# Patient Record
Sex: FEMALE | Race: BLACK OR AFRICAN AMERICAN | Employment: UNEMPLOYED | URBAN - METROPOLITAN AREA
[De-identification: names, ages, dates, MRNs, and addresses within clinical notes are randomized per-mention and may not be internally consistent; named-entity substitution may affect disease eponyms.]

---

## 2020-03-23 ENCOUNTER — HOSPITAL ENCOUNTER (EMERGENCY)
Age: 25
Discharge: HOME | End: 2020-03-23
Attending: EMERGENCY MEDICINE
Payer: SELF-PAY

## 2020-03-23 VITALS
BODY MASS INDEX: 29.99 KG/M2 | DIASTOLIC BLOOD PRESSURE: 84 MMHG | HEART RATE: 81 BPM | HEIGHT: 65 IN | RESPIRATION RATE: 18 BRPM | OXYGEN SATURATION: 99 % | WEIGHT: 180 LBS | TEMPERATURE: 98.7 F | SYSTOLIC BLOOD PRESSURE: 134 MMHG

## 2020-03-23 DIAGNOSIS — J30.1 SEASONAL ALLERGIC RHINITIS DUE TO POLLEN: ICD-10-CM

## 2020-03-23 DIAGNOSIS — B34.9 VIRAL ILLNESS: Primary | ICD-10-CM

## 2020-03-23 PROCEDURE — 99282 EMERGENCY DEPT VISIT SF MDM: CPT

## 2020-03-23 RX ORDER — ALBUTEROL SULFATE 90 UG/1
2 AEROSOL, METERED RESPIRATORY (INHALATION)
Qty: 1 INHALER | Refills: 0 | Status: SHIPPED | OUTPATIENT
Start: 2020-03-23 | End: 2022-02-08

## 2020-03-23 RX ORDER — FEXOFENADINE HYDROCHLORIDE AND PSEUDOEPHEDRINE HYDROCHLORIDE 180; 240 MG/1; MG/1
1 TABLET, FILM COATED, EXTENDED RELEASE ORAL DAILY
Qty: 7 TAB | Refills: 0 | Status: SHIPPED | OUTPATIENT
Start: 2020-03-23 | End: 2020-03-30

## 2020-03-23 NOTE — ED NOTES
China Krishnamurthy is a 30 y.o. female that was discharged in good condition. The patients diagnosis, condition and treatment were explained to  patient and aftercare instructions were given. The patient verbalized understanding.

## 2020-03-23 NOTE — DISCHARGE INSTRUCTIONS
HOME ISOLATION PRECAUTIONS DURING COVID-19 OUTBREAK (per CDC guidelines)    1) Stay home except to get medical care:  People who are mildly ill with COVID-19 are able to isolate at home during their illness. You should restrict activities outside your home, except for getting medical care. Do not go to work, school, or public areas. Avoid using public transportation, ride-sharing, or taxis. 2) Separate yourself from other people and animals in your home  People: As much as possible, you should stay in a specific room and away from other people in your home. Also, you should use a separate bathroom, if available. 3) Animals: You should restrict contact with pets and other animals while you are sick with COVID-19, just like you would around other people. Although there have not been reports of pets or other animals becoming sick with COVID-19, it is still recommended that people sick with COVID-19 limit contact with animals until more information is known about the virus. When possible, have another member of your household care for your animals while you are sick. If you are sick with COVID-19, avoid contact with your pet, including petting, snuggling, being kissed or licked, and sharing food. If you must care for your pet or be around animals while you are sick, wash your hands before and after you interact with pets and wear a facemask. See COVID-19 and Animals for more information. 4) Call ahead before visiting your doctor  If you have a medical appointment, call the healthcare provider and tell them that you have or may have COVID-19. This will help the healthcare providers office take steps to keep other people from getting infected or exposed. 5) Wear a facemask  You should wear a facemask when you are around other people (e.g., sharing a room or vehicle) or pets and before you enter a healthcare providers office.  If you are not able to wear a facemask (for example, because it causes trouble breathing), then people who live with you should not stay in the same room with you, or they should wear a facemask if they enter your room. 6) Cover your coughs and sneezes  Cover your mouth and nose with a tissue when you cough or sneeze. Throw used tissues in a lined trash can. Immediately wash your hands with soap and water for at least 20 seconds or, if soap and water are not available, clean your hands with an alcohol-based hand  that contains at least 60% alcohol. 7) Clean your hands often  Wash your hands often with soap and water for at least 20 seconds, especially after blowing your nose, coughing, or sneezing; going to the bathroom; and before eating or preparing food. If soap and water are not readily available, use an alcohol-based hand  with at least 60% alcohol, covering all surfaces of your hands and rubbing them together until they feel dry. 8) Soap and water are the best option if hands are visibly dirty. Avoid touching your eyes, nose, and mouth with unwashed hands. 9) Avoid sharing personal household items  You should not share dishes, drinking glasses, cups, eating utensils, towels, or bedding with other people or pets in your home. After using these items, they should be washed thoroughly with soap and water. 10) Clean all high-touch surfaces everyday  High touch surfaces include counters, tabletops, doorknobs, bathroom fixtures, toilets, phones, keyboards, tablets, and bedside tables. Also, clean any surfaces that may have blood, stool, or body fluids on them. Use a household cleaning spray or wipe, according to the label instructions. Labels contain instructions for safe and effective use of the cleaning product including precautions you should take when applying the product, such as wearing gloves and making sure you have good ventilation during use of the product.     11) Monitor your symptoms  Seek prompt medical attention if your illness is worsening (e.g., difficulty breathing). Before seeking care, call your healthcare provider and tell them that you have, or are being evaluated for, COVID-19. Put on a facemask before you enter the facility. These steps will help the healthcare providers office to keep other people in the office or waiting room from getting infected or exposed. Ask your healthcare provider to call the local or state health department. Persons who are placed under active monitoring or facilitated self-monitoring should follow instructions provided by their local health department or occupational health professionals, as appropriate. When working with your local health department check their available hours. 12) If you have a medical emergency and need to call 911, notify the dispatch personnel that you have, or are being evaluated for COVID-19. If possible, put on a facemask before emergency medical services arrive. 13) Discontinuing home isolation  Patients with confirmed COVID-19 should remain under home isolation precautions until the risk of secondary transmission to others is thought to be low. The decision to discontinue home isolation precautions should be made on a case-by-case basis, in consultation with healthcare providers and [facility] and local health departments. Recommended precautions for household members, intimate partners, and caregivers in a nonhealthcare setting of  A patient with symptomatic laboratory-confirmed COVID-19   or   a patient under investigation  Household members, intimate partners, and caregivers in a nonhealthcare setting may have close contact2 with a person with symptomatic, laboratory-confirmed COVID-19 or a person under investigation.  Close contacts should monitor their health; they should call their healthcare provider right away if they develop symptoms suggestive of COVID-19 (e.g., fever, cough, shortness of breath)     Close contacts should also follow these recommendations:   Make sure that you understand and can help the patient follow their healthcare provider's instructions for medication(s) and care. You should help the patient with basic needs in the home and provide support for getting groceries, prescriptions, and other personal needs.  Monitor the patient's symptoms. If the patient is getting sicker, call his or her healthcare provider and tell them that the patient has laboratory-confirmed COVID-19. This will help the healthcare provider's office take steps to keep other people in the office or waiting room from getting infected. Ask the healthcare provider to call the local or [facility] health department for additional guidance. If the patient has a medical emergency and you need to call 911, notify the dispatch personnel that the patient has, or is being evaluated for COVID-19.   Household members should stay in another room or be  from the patient as much as possible. Household members should use a separate bedroom and bathroom, if available.  Prohibit visitors who do not have an essential need to be in the home.  Make sure that shared spaces in the home have good air flow, such as by an air conditioner or an opened window, weather permitting.  Perform hand hygiene frequently. Wash your hands often with soap and water for at least 20 seconds or use an alcohol-based hand  that contains 60 to 95% alcohol, covering all surfaces of your hands and rubbing them together until they feel dry. Soap and water should be used preferentially if hands are visibly dirty.  You and the patient should wear a facemask if you are in the same room.  Wear a disposable facemask and gloves when you touch or have contact with the patient's blood, stool, or body fluids, such as saliva, sputum, nasal mucus, vomit, urine. o Throw out disposable facemasks and gloves after using them. Do not reuse. o When removing personal protective equipment, first remove and dispose of gloves.  Then, immediately clean your hands with soap and water or alcohol-based hand . Next, remove and dispose of facemask, and immediately clean your hands again with soap and water or alcohol-based hand . [address] thoroughly.  o Immediately remove and wash clothes or bedding that have blood, stool, or body fluids on them.  o Wear disposable gloves while handling soiled items and keep soiled items away from your body. Clean your hands (with soap and water or an alcohol-based hand ) immediately after removing your gloves. o Read and follow directions on labels of laundry or clothing items and detergent. In general, using a normal laundry detergent according to washing machine instructions and dry thoroughly using the warmest temperatures recommended on the clothing label.  Discuss any additional questions with your state or local health department or healthcare provider. Footnotes  2Close contact is defined as--  a) being within approximately 6 feet (2 meters) of a COVID-19 case for a prolonged period of time; close contact can occur while caring for, living with, visiting, or sharing a health care waiting area or room with a COVID-19 case  - or -  b) having direct contact with infectious secretions of a COVID-19 case (e.g., being coughed on).

## 2020-03-23 NOTE — ED PROVIDER NOTES
EMERGENCY DEPARTMENT HISTORY AND PHYSICAL EXAM    Date: 3/23/2020  Patient Name: Marissa Bee    History of Presenting Illness     Chief Complaint   Patient presents with    Cough    Nasal Congestion         History Provided By: Patient        Additional History (Context): Marissa Bee is a 30 y.o. female with     Patient voices concern over possible COVID-19 infection. Patient denies close contact to confirmed patient with coronavirus. Also denies history of travel from any geographic areas of concern within the last 14 days prior to symptom onset. Grace was seen today for Cough and Nasal Congestion    She reports this started 1 days ago and is gradually worsening. She also reports: rhinorrhea, post nasal drip, productive cough, chest congestion and SOB. She denies a history of: sweats and chills, generalized sinus pain, swollen glands, wheezing and myalgias. Treatments have included: none. Relevant medical problems include include: No pertinent additional PMH and childhood asthma and seasonal allergies. PCP: Jericho Yoon MD    Current Outpatient Medications   Medication Sig Dispense Refill    albuterol (Ventolin HFA) 90 mcg/actuation inhaler Take 2 Puffs by inhalation every four (4) hours as needed for Wheezing. 1 Inhaler 0    fexofenadine-pseudoephedrine (Allegra-D 24 Hour) 180-240 mg per tablet Take 1 Tab by mouth daily for 7 days.  Indications: runny nose/congestion 7 Tab 0       Past History     Past Medical History:  Past Medical History:   Diagnosis Date    Gastrointestinal disorder     Scoliosis        Past Surgical History:  Past Surgical History:   Procedure Laterality Date    HX ENDOSCOPY         Family History:  Family History   Problem Relation Age of Onset    Diabetes Maternal Grandmother     Hypertension Maternal Grandmother     Stroke Maternal Grandmother     Stroke Maternal Grandfather     Hypertension Maternal Grandfather     Diabetes Maternal Grandfather     Cancer Neg Hx     Heart Disease Neg Hx        Social History:  Social History     Tobacco Use    Smoking status: Never Smoker   Substance Use Topics    Alcohol use: No    Drug use: No       Allergies: Allergies   Allergen Reactions    Motrin [Ibuprofen] Other (comments)     Burned GI track and needed endoscopy         Review of Systems     Review of Systems   Constitutional: Negative for chills and fever. HENT: positive for nasal congestion, sore throat, rhinorrhea  Eyes: Negative. Respiratory: Positive for productive cough and shortness of breath with coughing. Cardiovascular: Negative for chest pain and palpitations. Gastrointestinal: Negative for abdominal pain, constipation, diarrhea, nausea and vomiting. Genitourinary: Negative. Negative for difficulty urinating and flank pain. Musculoskeletal: Negative for back pain. Negative for gait problem and neck pain. Skin: Negative. Allergic/Immunologic: Negative. Neurological: Negative for dizziness, weakness, numbness and headaches. Psychiatric/Behavioral: Negative. All other systems reviewed and are negative. All Other Systems Negative  Physical Exam     Vitals:    03/23/20 1228   BP: 134/84   Pulse: 81   Resp: 18   Temp: 98.7 °F (37.1 °C)   SpO2: 99%   Weight: 81.6 kg (180 lb)   Height: 5' 5\" (1.651 m)     Physical Exam  Vitals signs and nursing note reviewed. Constitutional:       General: She is not in acute distress. Appearance: Normal appearance. She is not ill-appearing, toxic-appearing or diaphoretic. HENT:      Head: Normocephalic and atraumatic. Jaw: No trismus. Right Ear: Hearing, tympanic membrane, ear canal and external ear normal.      Left Ear: Tympanic membrane, ear canal and external ear normal.      Nose: Mucosal edema, congestion and rhinorrhea present. Right Turbinates: Enlarged and swollen. Left Turbinates: Enlarged and swollen.       Mouth/Throat:      Mouth: Mucous membranes are moist.      Pharynx: Oropharynx is clear. Uvula midline. No pharyngeal swelling, oropharyngeal exudate, posterior oropharyngeal erythema or uvula swelling. Tonsils: No tonsillar exudate or tonsillar abscesses. Eyes:      General: Lids are normal. Vision grossly intact. Conjunctiva/sclera: Conjunctivae normal.      Pupils: Pupils are equal, round, and reactive to light. Neck:      Musculoskeletal: Full passive range of motion without pain and normal range of motion. Cardiovascular:      Rate and Rhythm: Normal rate and regular rhythm. Pulses: Normal pulses. Heart sounds: Normal heart sounds. No murmur. No friction rub. No gallop. Pulmonary:      Effort: Pulmonary effort is normal. No respiratory distress. Breath sounds: Normal breath sounds. No stridor. No wheezing or rales. Abdominal:      Palpations: Abdomen is soft. Tenderness: There is no abdominal tenderness. Musculoskeletal: Normal range of motion. Skin:     General: Skin is warm and dry. Capillary Refill: Capillary refill takes less than 2 seconds. Neurological:      General: No focal deficit present. Mental Status: She is alert and oriented to person, place, and time. Psychiatric:         Mood and Affect: Mood normal.         Behavior: Behavior normal. Behavior is cooperative. Diagnostic Study Results     Labs -   No results found for this or any previous visit (from the past 12 hour(s)). Radiologic Studies -   No orders to display     CT Results  (Last 48 hours)    None        CXR Results  (Last 48 hours)    None            Medical Decision Making   I am the first provider for this patient. I reviewed the vital signs, available nursing notes, past medical history, past surgical history, family history and social history. Vital Signs-Reviewed the patient's vital signs.       Pulse Oximetry Analysis - 99% on room air    Records Reviewed: Nursing notes, old medical records and any previous labs, imaging, visits, consultations pertinent to patient care    Procedures:  Procedures    PPE worn during exam: Gloves, eye protection, gown, and surgical mask    ED Course: Progress Notes, Reevaluation, and Consults:    Provider Notes (Medical Decision Making):     Differential diagnosis: Seasonal allergies/allergic rhinitis, URI, strep/viral pharyngitis, pneumonia, reactive airway/asthma exacerbation 66-year-old female    66-year-old female patient here with coughing, nasal congestion, and rhinorrhea. Vital signs are stable and patient is afebrile. Exam unremarkable. No retraction,stridor, or wheezing, No indication for labs or imaging. Most consistent w/ viral infection, URI, COVID-19. Due to history of childhood asthma and allergic rhinitis will cover for symptomatic treatment for this. As well as, due to coronavirus outbreak will recommend a 14-day self quarantine. Patient is in agreement to the quarantine measures. Will be provided a work note as necessary. Reviewed with her that COVID-19 pandemic is an evolving situation with rapidly changing recommendations & guidelines. Medical decisions are made based on the the best information available at the time. Recommended she stay tuned for updates published by trusted sources and to advise your PCP of any unexpected changes in clinical condition     F/u w/ PCP and supportive treatment. Discussed the possibility of worsening despite outpatient treatment plan. Patient understands this possibility and will follow-up immediately with worsening symptoms. Follow-up w/ PCP and supportive treatment. Recommended taking Tylenol as needed for fever and body aches and to avoid NSAIDs if possible. MED RECONCILIATION:  No current facility-administered medications for this encounter.       Current Outpatient Medications   Medication Sig    albuterol (Ventolin HFA) 90 mcg/actuation inhaler Take 2 Puffs by inhalation every four (4) hours as needed for Wheezing.  fexofenadine-pseudoephedrine (Allegra-D 24 Hour) 180-240 mg per tablet Take 1 Tab by mouth daily for 7 days. Indications: runny nose/congestion       Disposition:  Discharge home in stable condition with strict 2-week self quarantine instructions    DISCHARGE NOTE:     Patient has been reexamined. Patient has no new complaints, changes, or physical findings. Vital signs are stable. Care plan outlined and precautions discussed. Results of work up, plan of care and treatment plan, expectations, etc were reviewed with the patient. All medications were reviewed with the patient; will d/c home with outpatient f/u. All of pt's questions and concerns were addressed. Patient was instructed and agrees to follow up with pcp/specialists if indicated, as well as to return to the ED upon further deterioration. Patient is ready to go home. Follow-up Information     Follow up With Specialties Details Why Contact Info    Jaydon Colby MD Pediatrics Schedule an appointment as soon as possible for a visit Follow-up from the Emergency Department [address] [facility] [address]  [address] [address]      [id number] [place] [address] EMERGENCY DEPT Emergency Medicine  As needed, If symptoms worsen [address] [facility]  [phone]          Current Discharge Medication List      START taking these medications    Details   albuterol (Ventolin HFA) 90 mcg/actuation inhaler Take 2 Puffs by inhalation every four (4) hours as needed for Wheezing. Qty: 1 Inhaler, Refills: 0      fexofenadine-pseudoephedrine (Allegra-D 24 Hour) 180-240 mg per tablet Take 1 Tab by mouth daily for 7 days. Indications: runny nose/congestion  Qty: 7 Tab, Refills: 0                 Diagnosis     Clinical Impression:   1. Viral illness    2. Seasonal allergic rhinitis due to pollen          Dictation disclaimer:  Please note that this dictation was completed with [employer], the [employer] voice recognition software.   Quite often unanticipated grammatical, syntax, homophones, and other interpretive errors are inadvertently transcribed by the computer software. Please disregard these errors. Please excuse any errors that have escaped final proofreading.

## 2020-03-24 ENCOUNTER — PATIENT OUTREACH (OUTPATIENT)
Dept: CASE MANAGEMENT | Age: 25
End: 2020-03-24

## 2020-03-24 NOTE — PROGRESS NOTES
COVID-19 Screening Initial Follow-up Note    Patient contacted regarding COVID-19 symptoms. Care Transition Nurse/ Ambulatory Care Manager contacted the patient by telephone to perform post discharge assessment. Verified name and  with patient as identifiers. Provided introduction to self, and explanation of the CTN/ACM role, and reason for call due to risk factors for infection and/or exposure to COVID-19. Patient states that she feels the same like having slight fever cough and some shortness of breath. CTN reminded Pt. To call PCP office for any questions, concerns, needs and/or assistance. Pt states okay. CTN advised Pt. To go back to ED for CP, increased or worsening SOB, difficulty breathing and/or having high fevers. Patient verbalized and repeated back understanding. Symptoms reviewed with patient who verbalized the following symptoms:   Fever yes slight fever per Pt. Fatigue no   Pain or aching joints no  Cough yes  Shortness of breath yes  Some per Pt. Hx of asthma per Pt. Confusion or unusual change in mental status no    Chills or shaking no    Sweating no    Fast heart rate no    Fast breathing no    Dizziness/lightheadedness no    Less urine output no    Cold, clammy, and pale skin no  Low body temperature no      Patient has following risk factors of: asthma CTN/ACM reviewed discharge instructions, medical action plan and red flags such as increased shortness of breath, increasing fever and signs of decompensation with patient who verbalized understanding. Discussed exposure protocols and quarantine with CDC Guidelines What to do if you are sick with coronavirus disease 2019 Patient who was given an opportunity for questions and concerns. The patient agrees to contact the [facility] exposure line, local health department and PCP office for questions related to their healthcare. CTN provided contact information for future reference.     Reviewed and educated patient on any new and changed medications related to discharge diagnosis     Plan for follow-up call in 5-7 days based on severity of symptoms and risk factors

## 2020-04-03 ENCOUNTER — PATIENT OUTREACH (OUTPATIENT)
Dept: CASE MANAGEMENT | Age: 25
End: 2020-04-03

## 2020-04-03 NOTE — PROGRESS NOTES
Care Transition Nurse/ Ambulatory Care Manager contacted the patient by telephone for follow up. Verified name and  with patient as identifiers. Provided introduction to self, and explanation of the CTN/ACM role, and reason for call due to risk factors for infection and/or exposure to COVID-19. Patient states that she is feeling better and all her S/S are all gone. Patient denied CP, SOB, fever, chills, headache, cough and congestion. Patient states that she is feeling better. Thanked us for follow up call and kept the conversation short.

## 2020-04-08 ENCOUNTER — PATIENT OUTREACH (OUTPATIENT)
Dept: CASE MANAGEMENT | Age: 25
End: 2020-04-08

## 2020-04-08 NOTE — PROGRESS NOTES
Someone answered the phone and states that Patient is not currently at home and wants CTN to call back at 6pm.

## 2020-04-28 ENCOUNTER — PATIENT OUTREACH (OUTPATIENT)
Dept: CASE MANAGEMENT | Age: 25
End: 2020-04-28

## 2020-04-28 NOTE — PROGRESS NOTES
Patient resolved from Transition of Care episode on 4/28/20    CTN reached Patient's family on phone who states that Pt. Will not be home till late tonight. CTN gave office contact information requesting for call back. Pt. Family states that she will give it to Pt. Patient family reported that Pt. Is doing well. No questions or concerns verbalized by Pt. Family at this time. No Pt. Medical information given to the family member who answered the Phone. No PHI or Permission to release info noted at this time. No further outreach scheduled with this CTN. Episode of Care resolved.

## 2022-02-08 ENCOUNTER — HOSPITAL ENCOUNTER (EMERGENCY)
Age: 27
Discharge: HOME | End: 2022-02-08
Attending: EMERGENCY MEDICINE

## 2022-02-08 VITALS
HEART RATE: 88 BPM | BODY MASS INDEX: 29.99 KG/M2 | OXYGEN SATURATION: 100 % | DIASTOLIC BLOOD PRESSURE: 104 MMHG | TEMPERATURE: 99.3 F | RESPIRATION RATE: 20 BRPM | SYSTOLIC BLOOD PRESSURE: 145 MMHG | WEIGHT: 180 LBS | HEIGHT: 65 IN

## 2022-02-08 DIAGNOSIS — K52.9 GASTROENTERITIS, ACUTE: Primary | ICD-10-CM

## 2022-02-08 DIAGNOSIS — Z20.822 SUSPECTED COVID-19 VIRUS INFECTION: ICD-10-CM

## 2022-02-08 LAB — SARS-COV-2, COV2: NORMAL

## 2022-02-08 PROCEDURE — 99282 EMERGENCY DEPT VISIT SF MDM: CPT

## 2022-02-08 PROCEDURE — U0003 INFECTIOUS AGENT DETECTION BY NUCLEIC ACID (DNA OR RNA); SEVERE ACUTE RESPIRATORY SYNDROME CORONAVIRUS 2 (SARS-COV-2) (CORONAVIRUS DISEASE [COVID-19]), AMPLIFIED PROBE TECHNIQUE, MAKING USE OF HIGH THROUGHPUT TECHNOLOGIES AS DESCRIBED BY CMS-2020-01-R: HCPCS

## 2022-02-08 PROCEDURE — 74011250637 HC RX REV CODE- 250/637: Performed by: PHYSICIAN ASSISTANT

## 2022-02-08 RX ORDER — DICYCLOMINE HYDROCHLORIDE 10 MG/1
10 CAPSULE ORAL 3 TIMES DAILY
Qty: 30 CAPSULE | Refills: 0 | Status: SHIPPED | OUTPATIENT
Start: 2022-02-08

## 2022-02-08 RX ORDER — DICYCLOMINE HYDROCHLORIDE 10 MG/1
20 CAPSULE ORAL
Status: COMPLETED | OUTPATIENT
Start: 2022-02-08 | End: 2022-02-08

## 2022-02-08 RX ORDER — ONDANSETRON 4 MG/1
4 TABLET, ORALLY DISINTEGRATING ORAL
Qty: 30 TABLET | Refills: 0 | Status: SHIPPED | OUTPATIENT
Start: 2022-02-08

## 2022-02-08 RX ORDER — ONDANSETRON 4 MG/1
4 TABLET, ORALLY DISINTEGRATING ORAL
Status: COMPLETED | OUTPATIENT
Start: 2022-02-08 | End: 2022-02-08

## 2022-02-08 RX ADMIN — DICYCLOMINE HYDROCHLORIDE 20 MG: 10 CAPSULE ORAL at 12:54

## 2022-02-08 RX ADMIN — ONDANSETRON 4 MG: 4 TABLET, ORALLY DISINTEGRATING ORAL at 12:54

## 2022-02-08 NOTE — Clinical Note
[address] [place] [address] [facility] EMERGENCY DEPT  [id number] [address] [phone]    Work/School Note    Date: 2/8/2022     To Whom It May concern:    Tyshawn Davila was evaluated by the following provider(s):  Attending Provider: Sheila Carrera MD  Physician Assistant: Samuel Bocanegra virus is suspected. Per the CDC guidelines we recommend home isolation until the following conditions are all met:    1. At least five days have passed since symptoms first appeared and/or had a close exposure,   2. After home isolation for five days, wearing a mask around others for the next five days,  3. At least 24 have passed since last fever without the use of fever-reducing medications and  4.  Symptoms (eg cough, shortness of breath) have improved      Sincerely,          BROCK Pastrana

## 2022-02-08 NOTE — ED TRIAGE NOTES
C/o nausea, vomiting, diarrhea, and headache since yesterday. She denies any recent covid contacts. Denies being vaccinated against covid.

## 2022-02-08 NOTE — Clinical Note
[address] [place] [address] [facility]  [address] [address] [phone]    Work/School Note    Date: 2/8/2022     To Whom It May concern:    Stormy Lindsey was evaluated by the following provider(s):  Attending Provider: Mag Seay MD  Physician Assistant: Seda Shay virus is suspected. Per the CDC guidelines we recommend home isolation until the following conditions are all met:    1. At least five days have passed since symptoms first appeared and/or had a close exposure,   2. After home isolation for five days, wearing a mask around others for the next five days,  3. At least 24 have passed since last fever without the use of fever-reducing medications and  4.  Symptoms (eg cough, shortness of breath) have improved      Sincerely,          BROCK Sorto
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
## 2022-02-09 ENCOUNTER — PATIENT OUTREACH (OUTPATIENT)
Dept: CASE MANAGEMENT | Age: 27
End: 2022-02-09

## 2022-02-09 LAB — SARS-COV-2, NAA: NOT DETECTED

## 2022-02-09 NOTE — PROGRESS NOTES
Date/Time:  2/9/2022 9:52 AM   Call within 2 business days of discharge: Yes   Attempted to reach patient by telephone. Left HIPPA compliant message requesting a return call. Will attempt to reach patient again. Covid test pending.

## 2022-02-10 ENCOUNTER — PATIENT OUTREACH (OUTPATIENT)
Dept: CASE MANAGEMENT | Age: 27
End: 2022-02-10

## 2022-02-10 NOTE — PROGRESS NOTES
Date/Time:  2/10/2022 1:22 PM   Call within 2 business days of discharge: Yes   Attempted to reach patient by telephone. Unable to leave HIPPA compliant message requesting a return call. Will attempt to reach patient again.

## 2023-12-29 NOTE — ED NOTES
Discharge teaching provided for pt regarding treatment received, medications prescribed, and follow up care. Pt verbalized understanding of all discharge instructions. All questions answered. Pt left ambulatory with discharge paperwork in hand. [facility] - Cardiology Stepdown  Initial Discharge Assessment       Primary Care Provider: Paco Amanda MD    Admission Diagnosis: Bacteremia [R78.81]    Admission Date: 12/28/2023  Expected Discharge Date: 1/2/2024    Transition of Care Barriers: None    Payor: [facility] MCARE / Plan: [facility] DUAL COMPLETE PPO SNP / Product Type: Medicare Advantage /     Extended Emergency Contact Information  Primary Emergency Contact: Afshin Oleg  Home Phone: [phone]  Mobile Phone: [phone]  Relation: Friend  Preferred language: English   needed? No  Secondary Emergency Contact: Kenneth Roman  Mobile Phone: [phone]  Relation: Son  Preferred language: English   needed? No    Discharge Plan A: Home with family, Home Health  Discharge Plan B: Home with family      [facility]  [address] [place] [address] [address]  Phone: [phone] Fax: [phone]    [facility] [address] [place] [address] [address] [place] [address]  Phone: [phone] Fax: [phone]    [facility] [address] [address]  [address]  [place] [address]  Phone: [phone] Fax: [phone]    [facility] [address] [address]  Phone: [phone] Fax: [phone]      Initial Assessment (most recent)       Adult Discharge Assessment - 12/29/23 1255          Discharge Assessment    Assessment Type Discharge Planning Assessment     Confirmed/corrected address, phone number and insurance Yes     Confirmed Demographics Correct on Facesheet     Source of Information patient     Communicated VIRA with patient/caregiver Date not available/Unable to determine     Reason For Admission Bacteremia     People in Home significant other   Oleg Pepe (RAMON) [phone]    Facility Arrived From: [facility] in  [facility]     Do you expect to return to your current living situation? Yes     Do you have help at home or someone to help you manage your care at home? Yes     Who are your caregiver(s) and their phone number(s)? Oleg Pepe (SO) [phone]     Prior to hospitilization cognitive status: Alert/Oriented     Current cognitive status: Alert/Oriented     Walking or Climbing Stairs Difficulty yes     Walking or Climbing Stairs ambulation difficulty, requires equipment     Mobility Management has a ramp and uses a rollator     Dressing/Bathing Difficulty no     Home Accessibility wheelchair accessible     Equipment Currently Used at Home rollator;glucometer;grab bar     Readmission within 30 days? No     Patient currently being followed by outpatient case management? No     Do you currently have service(s) that help you manage your care at home? Yes     Name and Contact number of agency stated current with [facility] [phone]( fax is [phone]) per medical record     Is the pt/caregiver preference to resume services with current agency Yes     Do you take prescription medications? Yes     Do you have prescription coverage? Yes     Coverage [facility] Dual Complete     Do you have any problems affording any of your prescribed medications? No     Is the patient taking medications as prescribed? yes     Who is going to help you get home at discharge? Oleg Pepe (SO) [phone]     How do you get to doctors appointments? family or friend will provide     Are you on dialysis? Yes     Dialysis Name and Scheduled days [facility] (10:30 am) Phone is [phone]     Do you take coumadin? No     Discharge Plan A Home with family;Home Health     Discharge Plan B Home with family     DME Needed Upon Discharge  none     Discharge Plan discussed with: Patient     Transition of Care Barriers None        Physical Activity    On average, how many days per week do you engage in  moderate to strenuous exercise (like a brisk walk)? 3 days     On average, how many minutes do you engage in exercise at this level? 20 min        Financial Resource Strain    How hard is it for you to pay for the very basics like food, housing, medical care, and heating? Not hard at all        Housing Stability    In the last 12 months, was there a time when you were not able to pay the mortgage or rent on time? No     In the last 12 months, how many places have you lived? 1     In the last 12 months, was there a time when you did not have a steady place to sleep or slept in a shelter (including now)? No        Transportation Needs    In the past 12 months, has lack of transportation kept you from medical appointments or from getting medications? No     In the past 12 months, has lack of transportation kept you from meetings, work, or from getting things needed for daily living? No        Food Insecurity    Within the past 12 months, you worried that your food would run out before you got the money to buy more. Never true     Within the past 12 months, the food you bought just didn't last and you didn't have money to get more. Never true        Stress    Do you feel stress - tense, restless, nervous, or anxious, or unable to sleep at night because your mind is troubled all the time - these days? Not at all        Social Connections    In a typical week, how many times do you talk on the phone with family, friends, or neighbors? More than three times a week     How often do you get together with friends or relatives? Once a week     How often do you attend Faith or Hinduism services? Never     Do you belong to any clubs or organizations such as Faith groups, unions, fraternal or athletic groups, or school groups? No     How often do you attend meetings of the clubs or organizations you belong to? Never     Are you , , , , never , or living with a partner?          Alcohol Use    Q1: How often do you have a drink containing alcohol? Never     Q2: How many drinks containing alcohol do you have on a typical day when you are drinking? Patient does not drink     Q3: How often do you have six or more drinks on one occasion? Never        OTHER    Name(s) of People in Home Oleg NULL) [phone]                          CM met with the patient at the bedside and discussed the discharge plan. Gave her the discharge booklet and placed contact numbers on the white board in the room. Patient alert and sitting up in bed.  Patient verified her name , , PCP, Insurance and Pharmacy . Stated she lives with Oleg NULL) [phone] in a mobile home with a ramp to point of entry bilateral working hand railing . She is not on coumadin.  She is a dialysis patient  and goes to [facility] on  for 10:30 am and their phone number is [phone]. DME's include:glucometer , grab bars in tub area and a rollator.  She stated she takes  medication as prescribed and has no problems getting  medication.she wants bedside delivery for her medications.      Discharge Plan A and Plan B have been determined by review of patient's clinical status, future medical and therapeutic needs, and coverage/benefits for post-acute care in coordination with multidisciplinary team members.    Rani Dinero RN         [phone]

## 2024-02-12 ENCOUNTER — HOSPITAL ENCOUNTER (EMERGENCY)
Facility: HOSPITAL | Age: 29
Discharge: HOME | End: 2024-02-13
Attending: EMERGENCY MEDICINE

## 2024-02-12 DIAGNOSIS — S00.83XA CONTUSION OF FACE, INITIAL ENCOUNTER: Primary | ICD-10-CM

## 2024-02-12 PROCEDURE — 99285 EMERGENCY DEPT VISIT HI MDM: CPT

## 2024-02-13 ENCOUNTER — APPOINTMENT (OUTPATIENT)
Facility: HOSPITAL | Age: 29
End: 2024-02-13

## 2024-02-13 VITALS
DIASTOLIC BLOOD PRESSURE: 66 MMHG | RESPIRATION RATE: 18 BRPM | SYSTOLIC BLOOD PRESSURE: 121 MMHG | OXYGEN SATURATION: 100 % | WEIGHT: 198 LBS | HEART RATE: 71 BPM | BODY MASS INDEX: 32.99 KG/M2 | TEMPERATURE: 97.2 F | HEIGHT: 65 IN

## 2024-02-13 LAB
ANION GAP SERPL CALC-SCNC: 3 MMOL/L (ref 3–18)
BUN SERPL-MCNC: 10 MG/DL (ref 7–18)
BUN/CREAT SERPL: 13 (ref 12–20)
CALCIUM SERPL-MCNC: 8.9 MG/DL (ref 8.5–10.1)
CHLORIDE SERPL-SCNC: 109 MMOL/L (ref 100–111)
CO2 SERPL-SCNC: 25 MMOL/L (ref 21–32)
CREAT SERPL-MCNC: 0.8 MG/DL (ref 0.6–1.3)
GLUCOSE SERPL-MCNC: 106 MG/DL (ref 74–99)
HCG SERPL QL: NEGATIVE
POTASSIUM SERPL-SCNC: 3.7 MMOL/L (ref 3.5–5.5)
SODIUM SERPL-SCNC: 137 MMOL/L (ref 136–145)

## 2024-02-13 PROCEDURE — 6360000004 HC RX CONTRAST MEDICATION: Performed by: EMERGENCY MEDICINE

## 2024-02-13 PROCEDURE — 80048 BASIC METABOLIC PNL TOTAL CA: CPT

## 2024-02-13 PROCEDURE — 6370000000 HC RX 637 (ALT 250 FOR IP): Performed by: EMERGENCY MEDICINE

## 2024-02-13 PROCEDURE — 84703 CHORIONIC GONADOTROPIN ASSAY: CPT

## 2024-02-13 PROCEDURE — 70498 CT ANGIOGRAPHY NECK: CPT

## 2024-02-13 RX ORDER — ACETAMINOPHEN 500 MG
1000 TABLET ORAL
Status: DISCONTINUED | OUTPATIENT
Start: 2024-02-13 | End: 2024-02-13

## 2024-02-13 RX ORDER — ACETAMINOPHEN 500 MG
1000 TABLET ORAL
Status: COMPLETED | OUTPATIENT
Start: 2024-02-13 | End: 2024-02-13

## 2024-02-13 RX ADMIN — ACETAMINOPHEN 1000 MG: 500 TABLET ORAL at 00:30

## 2024-02-13 RX ADMIN — IOPAMIDOL 80 ML: 755 INJECTION, SOLUTION INTRAVENOUS at 01:57

## 2024-02-13 NOTE — ED NOTES
Discharge instructions explained to patient and written copy give. Patient verbalized understanding. Patient instructed to follow up with Dr. Albrecht in three days. IV Removed from Left ac by writer.       Patient discharged at this time. This RN reviewed discharge instructions at this time with the patient.  patient verbalized understanding and does not have any questions. Pt ambulatory upon discharge, & in stable condition. Pt armband removed & shredded. Patient I.V was discharged.
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
## 2024-02-13 NOTE — ED NOTES
Assumed care, pt A&O x4, resp even and unlabored. NAD noted or indicated. Pt has no complaints or questions. Pt connected to monitor, VSS. RN to continue to monitor pt. Call bell within reach.
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
## 2024-03-02 ENCOUNTER — HOSPITAL ENCOUNTER (EMERGENCY)
Facility: HOSPITAL | Age: 29
Discharge: HOME | End: 2024-03-02
Attending: STUDENT IN AN ORGANIZED HEALTH CARE EDUCATION/TRAINING PROGRAM

## 2024-03-02 VITALS
HEIGHT: 65 IN | RESPIRATION RATE: 16 BRPM | SYSTOLIC BLOOD PRESSURE: 151 MMHG | BODY MASS INDEX: 32.99 KG/M2 | TEMPERATURE: 97.8 F | HEART RATE: 87 BPM | DIASTOLIC BLOOD PRESSURE: 92 MMHG | OXYGEN SATURATION: 98 % | WEIGHT: 198 LBS

## 2024-03-02 DIAGNOSIS — N76.0 BACTERIAL VAGINOSIS: Primary | ICD-10-CM

## 2024-03-02 DIAGNOSIS — B96.89 BACTERIAL VAGINOSIS: Primary | ICD-10-CM

## 2024-03-02 LAB
APPEARANCE UR: CLEAR
BILIRUB UR QL: NEGATIVE
COLOR UR: YELLOW
EPITH CASTS URNS QL MICRO: ABNORMAL /LPF (ref 0–5)
GLUCOSE UR STRIP.AUTO-MCNC: NEGATIVE MG/DL
HCG UR QL: NEGATIVE
HGB UR QL STRIP: ABNORMAL
KETONES UR QL STRIP.AUTO: ABNORMAL MG/DL
LEUKOCYTE ESTERASE UR QL STRIP.AUTO: NEGATIVE
MUCOUS THREADS URNS QL MICRO: ABNORMAL /LPF
NITRITE UR QL STRIP.AUTO: NEGATIVE
PH UR STRIP: 6 (ref 5–8)
PROT UR STRIP-MCNC: 30 MG/DL
RBC #/AREA URNS HPF: ABNORMAL /HPF (ref 0–5)
SERVICE CMNT-IMP: NORMAL
SP GR UR REFRACTOMETRY: >1.03 (ref 1–1.03)
UROBILINOGEN UR QL STRIP.AUTO: 1 EU/DL (ref 0.2–1)
WBC URNS QL MICRO: ABNORMAL /HPF (ref 0–4)
WET PREP GENITAL: NORMAL

## 2024-03-02 PROCEDURE — 99283 EMERGENCY DEPT VISIT LOW MDM: CPT

## 2024-03-02 PROCEDURE — 87661 TRICHOMONAS VAGINALIS AMPLIF: CPT

## 2024-03-02 PROCEDURE — 81025 URINE PREGNANCY TEST: CPT

## 2024-03-02 PROCEDURE — 81001 URINALYSIS AUTO W/SCOPE: CPT

## 2024-03-02 PROCEDURE — 87591 N.GONORRHOEAE DNA AMP PROB: CPT

## 2024-03-02 PROCEDURE — 87210 SMEAR WET MOUNT SALINE/INK: CPT

## 2024-03-02 PROCEDURE — 87491 CHLMYD TRACH DNA AMP PROBE: CPT

## 2024-03-02 RX ORDER — METRONIDAZOLE 500 MG/1
500 TABLET ORAL 2 TIMES DAILY
Qty: 14 TABLET | Refills: 0 | Status: SHIPPED | OUTPATIENT
Start: 2024-03-02 | End: 2024-03-09

## 2024-03-02 ASSESSMENT — ENCOUNTER SYMPTOMS
VOMITING: 0
COUGH: 0
ABDOMINAL PAIN: 0
NAUSEA: 0

## 2024-03-02 ASSESSMENT — PAIN - FUNCTIONAL ASSESSMENT: PAIN_FUNCTIONAL_ASSESSMENT: NONE - DENIES PAIN

## 2024-03-02 NOTE — DISCHARGE INSTRUCTIONS
Ms. Cowart,    We evaluated you for your vaginal bleeding.  You are not pregnant.  You do not have a urinary tract infection.  We did find evidence of bacterial vaginosis.  This is a bacterial infection.  Treated with the antibiotic we have prescribed which is called metronidazole or Flagyl.  Take 1 500 mg tablet twice a day for 7 days.  We also tested you for gonorrhea and chlamydia.  These test will take longer to resolve, likely 2 to 3 days.  We will give you a call with the results.  We discussed that he would like to wait for the results and set of treating empirically.

## 2024-03-02 NOTE — ED TRIAGE NOTES
Ambulatory to triage with vaginal \"spotting\" and d/c a few days ago. Denies N/V/D. States last MP 2/18/24 and lasted longer than usual.

## 2024-03-02 NOTE — ED PROVIDER NOTES
EMERGENCY DEPARTMENT HISTORY AND PHYSICAL EXAM    3:14 PM      Date: 3/2/2024  Patient Name: Celena Cowart    History of Presenting Illness     Chief Complaint   Patient presents with    Vaginal Bleeding       History From: Patient  HPI    28-year-old female presenting with 2 to 3 days of vaginal spotting.  States she saw bright red blood in her underwear.  Denies other symptoms including fevers, chills, abdominal pain, nausea, vomiting.  Patient is sexually active without protection.  Denies history of abdominal pelvic surgery.  States her LMP started 2/18 and lasted slightly longer than her normal 5 to 7 days.  Not on contraception of any kind    Nursing Notes were all reviewed and agreed with or any disagreements were addressed in the HPI.    PCP: Janey Albrecht MD    No current facility-administered medications for this encounter.     Current Outpatient Medications   Medication Sig Dispense Refill    metroNIDAZOLE (FLAGYL) 500 MG tablet Take 1 tablet by mouth 2 times daily for 7 days 14 tablet 0    dicyclomine (BENTYL) 10 MG capsule Take 10 mg by mouth 3 times daily (Patient not taking: Reported on 2/12/2024)      ondansetron (ZOFRAN-ODT) 4 MG disintegrating tablet Take 4 mg by mouth every 8 hours as needed (Patient not taking: Reported on 2/12/2024)         Past History     Past Medical History:  Past Medical History:   Diagnosis Date    Gastrointestinal disorder     Lower leg edema     Scoliosis        Past Surgical History:  Past Surgical History:   Procedure Laterality Date    UPPER GASTROINTESTINAL ENDOSCOPY         Family History:  Family History   Problem Relation Age of Onset    Cancer Neg Hx     Stroke Maternal Grandfather     Hypertension Maternal Grandmother     Heart Disease Neg Hx     Diabetes Maternal Grandfather     Diabetes Maternal Grandmother     Hypertension Maternal Grandfather     Stroke Maternal Grandmother        Social History:  Social History     Tobacco Use    Smoking status: Some

## 2024-03-02 NOTE — ED NOTES
Patient up for discharge. Discharge instructions reviewed with patient by the Provider. The patient is stable for discharge. I have reiterated/educated the patient on discharge education and instructions, including when to notify provider; importance of follow up appointment; and medication adherence. Printed discharge instructions were given and reviewed with the patient. Appropriate educational materials and medication side effects teaching were also provided.The patient verbalizes understanding of discharge paperwork with all questions/concerns addressed. Cardiac monitor and IV line(s) were removed. Patient with discharge paperwork and education materials in hand. There were no personal belongings, valuables or home medications left at patient's bedside observed.        Medication List        START taking these medications      metroNIDAZOLE 500 MG tablet  Commonly known as: FLAGYL  Take 1 tablet by mouth 2 times daily for 7 days            ASK your doctor about these medications      dicyclomine 10 MG capsule  Commonly known as: BENTYL     ondansetron 4 MG disintegrating tablet  Commonly known as: ZOFRAN-ODT               Where to Get Your Medications        These medications were sent to [facility] [address] [place] [address] [address] - P [phone] - F [phone]  [address] [place] [id number]      Phone: [phone]   metroNIDAZOLE 500 MG tablet

## 2024-03-04 LAB
C TRACH RRNA SPEC QL NAA+PROBE: NEGATIVE
N GONORRHOEA RRNA SPEC QL NAA+PROBE: NEGATIVE
SPECIMEN SOURCE: NORMAL
T VAGINALIS RRNA SPEC QL NAA+PROBE: NEGATIVE

## 2024-10-06 ENCOUNTER — HOSPITAL ENCOUNTER (EMERGENCY)
Facility: HOSPITAL | Age: 29
Discharge: HOME | End: 2024-10-06

## 2024-10-06 ENCOUNTER — APPOINTMENT (OUTPATIENT)
Facility: HOSPITAL | Age: 29
End: 2024-10-06
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
## 2024-10-06 DIAGNOSIS — S93.491A SPRAIN OF ANTERIOR TALOFIBULAR LIGAMENT OF RIGHT ANKLE, INITIAL ENCOUNTER: Primary | ICD-10-CM

## 2024-10-06 PROCEDURE — 99283 EMERGENCY DEPT VISIT LOW MDM: CPT

## 2024-10-06 PROCEDURE — 73610 X-RAY EXAM OF ANKLE: CPT

## 2024-10-06 PROCEDURE — 6370000000 HC RX 637 (ALT 250 FOR IP): Performed by: EMERGENCY MEDICINE

## 2024-10-06 RX ORDER — PREDNISONE 10 MG/1
TABLET ORAL
Qty: 21 EACH | Refills: 1 | Status: SHIPPED | OUTPATIENT
Start: 2024-10-06

## 2024-10-06 RX ORDER — ACETAMINOPHEN 500 MG
1000 TABLET ORAL 4 TIMES DAILY PRN
Qty: 30 TABLET | Refills: 1 | Status: SHIPPED | OUTPATIENT
Start: 2024-10-06

## 2024-10-06 RX ORDER — ACETAMINOPHEN 500 MG
1000 TABLET ORAL
Status: COMPLETED | OUTPATIENT
Start: 2024-10-06 | End: 2024-10-06

## 2024-10-06 RX ADMIN — ACETAMINOPHEN 1000 MG: 500 TABLET ORAL at 23:30

## 2024-10-07 VITALS
SYSTOLIC BLOOD PRESSURE: 142 MMHG | DIASTOLIC BLOOD PRESSURE: 90 MMHG | TEMPERATURE: 99.2 F | OXYGEN SATURATION: 99 % | RESPIRATION RATE: 16 BRPM | BODY MASS INDEX: 31.82 KG/M2 | HEART RATE: 87 BPM | WEIGHT: 191 LBS | HEIGHT: 65 IN

## 2024-10-07 NOTE — ED TRIAGE NOTES
Patient comes in complaining of right ankle pain that started earlier today. Patient denies any injury.

## 2024-10-07 NOTE — ED PROVIDER NOTES
Prescriptions    ACETAMINOPHEN (TYLENOL) 500 MG TABLET    Take 2 tablets by mouth 4 times daily as needed for Pain    PREDNISONE 10 MG (21) TBPK    Take 6 tablets on day 1; take 5 tablets on day 2; take 4 tablets on day 3; take 3 tablets on day 4; take 2 tablets on day 5; take 1 tablet on day 6.     Controlled Substances Monitoring:          No data to display                (Please note that portions of this note were completed with a voice recognition program.  Efforts were made to edit the dictations but occasionally words are mis-transcribed.)    CARMELO Ness (electronically signed)  Attending Emergency Physician           Brenna Dominique PA  10/06/24 [id number]

## 2024-10-07 NOTE — ED NOTES
Patient given crutches and demonstrated use.    Patient given discharge instructions and verbalized understanding.

## 2024-10-23 ENCOUNTER — HOSPITAL ENCOUNTER (EMERGENCY)
Facility: HOSPITAL | Age: 29
Discharge: HOME | End: 2024-10-23

## 2024-10-23 VITALS
RESPIRATION RATE: 18 BRPM | DIASTOLIC BLOOD PRESSURE: 99 MMHG | HEIGHT: 65 IN | WEIGHT: 192 LBS | HEART RATE: 80 BPM | OXYGEN SATURATION: 97 % | SYSTOLIC BLOOD PRESSURE: 153 MMHG | TEMPERATURE: 98.5 F | BODY MASS INDEX: 31.99 KG/M2

## 2024-10-23 DIAGNOSIS — S93.491D SPRAIN OF ANTERIOR TALOFIBULAR LIGAMENT OF RIGHT ANKLE, SUBSEQUENT ENCOUNTER: ICD-10-CM

## 2024-10-23 DIAGNOSIS — Z76.89 RETURN TO WORK EXAM: Primary | ICD-10-CM

## 2024-10-23 PROCEDURE — 99282 EMERGENCY DEPT VISIT SF MDM: CPT

## 2024-10-23 ASSESSMENT — PAIN - FUNCTIONAL ASSESSMENT: PAIN_FUNCTIONAL_ASSESSMENT: 0-10

## 2024-10-23 ASSESSMENT — PAIN DESCRIPTION - DESCRIPTORS: DESCRIPTORS: ACHING;DULL

## 2024-10-23 ASSESSMENT — PAIN DESCRIPTION - LOCATION: LOCATION: ANKLE

## 2024-10-23 ASSESSMENT — PAIN SCALES - GENERAL: PAINLEVEL_OUTOF10: 2

## 2024-10-23 ASSESSMENT — PAIN DESCRIPTION - PAIN TYPE: TYPE: ACUTE PAIN

## 2024-10-23 ASSESSMENT — PAIN DESCRIPTION - ORIENTATION: ORIENTATION: RIGHT

## 2024-10-23 NOTE — ED TRIAGE NOTES
Patient presented to the Emergency Dept with a complaint of right ankle pain which started 2 weeks ago    Patient states that she sprained her ankle and on her discharge paper states that she should  return for follow up and rates pain 2/10 on pain scale, no new injury    Patient alert and oriented x 4, patient breathes freely on room air in nil cardiopulmonary distress

## 2024-10-23 NOTE — ED PROVIDER NOTES
ankle.  DP PT pulses palpable no swelling normal gait.  Full range of motion at ankle without difficulty.   Skin:     General: Skin is warm.   Neurological:      Mental Status: She is alert and oriented to person, place, and time.      Motor: No weakness.      Gait: Gait normal.   Psychiatric:         Mood and Affect: Mood normal.         Behavior: Behavior normal.         Thought Content: Thought content normal.         Judgment: Judgment normal.         DIAGNOSTIC RESULTS     EMERGENCY DEPARTMENT COURSE and DIFFERENTIAL DIAGNOSIS/MDM:   Vitals:    Vitals:    10/23/24 1814   BP: (!) 153/99   Pulse: 80   Resp: 18   Temp: 98.5 °F (36.9 °C)   TempSrc: Oral   SpO2: 97%   Weight: 87.1 kg (192 lb)   Height: 1.651 m (5' 5\")       Can return to work full capacity.    Medical Decision Making          REASSESSMENT          FINAL IMPRESSION      1. Return to work exam    2. Sprain of anterior talofibular ligament of right ankle, subsequent encounter          DISPOSITION/PLAN   DISPOSITION Decision To Discharge 10/23/2024 06:42:28 PM           PATIENT REFERRED TO:  Giovanna Jeffers DPM  [address] [facility] [address]  [phone]            DISCHARGE MEDICATIONS:  Discharge Medication List as of 10/23/2024  6:45 PM        Controlled Substances Monitoring:          No data to display                (Please note that portions of this note were completed with a voice recognition program.  Efforts were made to edit the dictations but occasionally words are mis-transcribed.)    CARMELO Ness (electronically signed)  Attending Emergency Physician           Brenna Dominique PA  10/23/24 7971